# Patient Record
Sex: FEMALE | Race: WHITE | NOT HISPANIC OR LATINO | URBAN - METROPOLITAN AREA
[De-identification: names, ages, dates, MRNs, and addresses within clinical notes are randomized per-mention and may not be internally consistent; named-entity substitution may affect disease eponyms.]

---

## 2019-10-01 ENCOUNTER — APPOINTMENT (RX ONLY)
Dept: URBAN - METROPOLITAN AREA CLINIC 70 | Facility: CLINIC | Age: 34
Setting detail: DERMATOLOGY
End: 2019-10-01

## 2019-10-01 ENCOUNTER — APPOINTMENT (RX ONLY)
Dept: URBAN - METROPOLITAN AREA CLINIC 71 | Facility: CLINIC | Age: 34
Setting detail: DERMATOLOGY
End: 2019-10-01

## 2019-10-01 DIAGNOSIS — L40.0 PSORIASIS VULGARIS: ICD-10-CM

## 2019-10-01 DIAGNOSIS — Z71.89 OTHER SPECIFIED COUNSELING: ICD-10-CM

## 2019-10-01 PROBLEM — F41.9 ANXIETY DISORDER, UNSPECIFIED: Status: ACTIVE | Noted: 2019-10-01

## 2019-10-01 PROCEDURE — 99202 OFFICE O/P NEW SF 15 MIN: CPT

## 2019-10-01 PROCEDURE — ? TREATMENT REGIMEN

## 2019-10-01 PROCEDURE — ? PRESCRIPTION

## 2019-10-01 PROCEDURE — ? COUNSELING

## 2019-10-01 RX ORDER — PIMECROLIMUS 10 MG/G
FTU CREAM TOPICAL BID
Qty: 1 | Refills: 2 | Status: ERX | COMMUNITY
Start: 2019-10-01

## 2019-10-01 RX ORDER — HYDROCORTISONE 25 MG/G
FTU OINTMENT TOPICAL BID
Qty: 1 | Refills: 1 | Status: ERX | COMMUNITY
Start: 2019-10-01

## 2019-10-01 RX ORDER — CALCIPOTRIENE AND BETAMETHASONE DIPROPIONATE 50; .5 UG/G; MG/G
FTU AEROSOL, FOAM TOPICAL QHS
Qty: 1 | Refills: 1 | Status: ERX | COMMUNITY
Start: 2019-10-01

## 2019-10-01 RX ADMIN — HYDROCORTISONE FTU: 25 OINTMENT TOPICAL at 15:58

## 2019-10-01 RX ADMIN — CALCIPOTRIENE AND BETAMETHASONE DIPROPIONATE FTU: 50; .5 AEROSOL, FOAM TOPICAL at 15:55

## 2019-10-01 RX ADMIN — PIMECROLIMUS FTU: 10 CREAM TOPICAL at 15:59

## 2019-10-01 ASSESSMENT — LOCATION SIMPLE DESCRIPTION DERM
LOCATION SIMPLE: RIGHT SUPERIOR EYELID
LOCATION SIMPLE: LEFT SUPERIOR EYELID
LOCATION SIMPLE: TRAPEZIAL NECK
LOCATION SIMPLE: LEFT SUPERIOR EYELID
LOCATION SIMPLE: LEFT ELBOW
LOCATION SIMPLE: TRAPEZIAL NECK
LOCATION SIMPLE: RIGHT SUPERIOR EYELID
LOCATION SIMPLE: LEFT ELBOW

## 2019-10-01 ASSESSMENT — LOCATION ZONE DERM
LOCATION ZONE: ARM
LOCATION ZONE: NECK
LOCATION ZONE: NECK
LOCATION ZONE: EYELID
LOCATION ZONE: EYELID
LOCATION ZONE: ARM

## 2019-10-01 ASSESSMENT — LOCATION DETAILED DESCRIPTION DERM
LOCATION DETAILED: LEFT ELBOW
LOCATION DETAILED: RIGHT LATERAL SUPERIOR EYELID
LOCATION DETAILED: MID TRAPEZIAL NECK
LOCATION DETAILED: LEFT MEDIAL SUPERIOR EYELID
LOCATION DETAILED: LEFT MEDIAL SUPERIOR EYELID
LOCATION DETAILED: MID TRAPEZIAL NECK
LOCATION DETAILED: LEFT ELBOW
LOCATION DETAILED: RIGHT LATERAL SUPERIOR EYELID

## 2019-10-01 NOTE — HPI: RASH (PSORIASIS)
How Severe Is Your Psoriasis?: mild
Do You Have A Family History Of Psoriasis?: yes
Is This A New Presentation, Or A Follow-Up?: Psoriasis
Additional History: Both parents with psoriasis, initial diagnosis 10 years ago, waxing/waning

## 2019-10-01 NOTE — PROCEDURE: TREATMENT REGIMEN
Action 4: Continue
Initiate Regimen: Enstilar QD for elbows \\nHydrocortisone BID PRN\\nElidel BID
Plan: Initiate regimen above, call with questions in concerns. Overuse of steroids reviewed. Brief review of expectations, treatment options, arthritic component. Seeing PCP tomorrow to discuss sore hands, she does work with her hands daily at work. Reviewed option for rheumatology evaluation with [facility].
Discontinue Regimen: Betamethasone
Detail Level: Zone

## 2019-10-01 NOTE — HPI: RASH (PSORIASIS)
Do You Have A Family History Of Psoriasis?: yes
How Severe Is Your Psoriasis?: mild
Is This A New Presentation, Or A Follow-Up?: Psoriasis
Additional History: Both parents with psoriasis, initial diagnosis 10 years ago, waxing/waning

## 2020-07-26 ENCOUNTER — HOSPITAL ENCOUNTER (EMERGENCY)
Dept: HOSPITAL 75 - ER | Age: 35
Discharge: HOME | End: 2020-07-26
Payer: COMMERCIAL

## 2020-07-26 VITALS — HEIGHT: 66.14 IN | WEIGHT: 125.66 LBS | BODY MASS INDEX: 20.2 KG/M2

## 2020-07-26 VITALS — SYSTOLIC BLOOD PRESSURE: 116 MMHG | DIASTOLIC BLOOD PRESSURE: 72 MMHG

## 2020-07-26 DIAGNOSIS — Z88.2: ICD-10-CM

## 2020-07-26 DIAGNOSIS — R10.84: Primary | ICD-10-CM

## 2020-07-26 LAB
ALBUMIN SERPL-MCNC: 4.3 GM/DL (ref 3.2–4.5)
ALP SERPL-CCNC: 27 U/L (ref 40–136)
ALT SERPL-CCNC: 13 U/L (ref 0–55)
AMORPH SED URNS QL MICRO: (no result) /LPF
APTT PPP: YELLOW S
BACTERIA #/AREA URNS HPF: (no result) /HPF
BASOPHILS # BLD AUTO: 0 10^3/UL (ref 0–0.1)
BASOPHILS NFR BLD AUTO: 1 % (ref 0–10)
BILIRUB SERPL-MCNC: 0.2 MG/DL (ref 0.1–1)
BILIRUB UR QL STRIP: NEGATIVE
BUN/CREAT SERPL: 11
CALCIUM SERPL-MCNC: 9 MG/DL (ref 8.5–10.1)
CHLORIDE SERPL-SCNC: 107 MMOL/L (ref 98–107)
CO2 SERPL-SCNC: 20 MMOL/L (ref 21–32)
CREAT SERPL-MCNC: 1 MG/DL (ref 0.6–1.3)
EOSINOPHIL # BLD AUTO: 0.7 10^3/UL (ref 0–0.3)
EOSINOPHIL NFR BLD AUTO: 12 % (ref 0–10)
ERYTHROCYTE [DISTWIDTH] IN BLOOD BY AUTOMATED COUNT: 14.1 % (ref 10–14.5)
FIBRINOGEN PPP-MCNC: (no result) MG/DL
GFR SERPLBLD BASED ON 1.73 SQ M-ARVRAT: > 60 ML/MIN
GLUCOSE SERPL-MCNC: 94 MG/DL (ref 70–105)
GLUCOSE UR STRIP-MCNC: NEGATIVE MG/DL
HCT VFR BLD CALC: 37 % (ref 35–52)
HGB BLD-MCNC: 12.5 G/DL (ref 11.5–16)
KETONES UR QL STRIP: NEGATIVE
LEUKOCYTE ESTERASE UR QL STRIP: NEGATIVE
LYMPHOCYTES # BLD AUTO: 1.4 X 10^3 (ref 1–4)
LYMPHOCYTES NFR BLD AUTO: 25 % (ref 12–44)
MANUAL DIFFERENTIAL PERFORMED BLD QL: NO
MCH RBC QN AUTO: 29 PG (ref 25–34)
MCHC RBC AUTO-ENTMCNC: 34 G/DL (ref 32–36)
MCV RBC AUTO: 85 FL (ref 80–99)
MONOCYTES # BLD AUTO: 0.6 X 10^3 (ref 0–1)
MONOCYTES NFR BLD AUTO: 10 % (ref 0–12)
NEUTROPHILS # BLD AUTO: 3 X 10^3 (ref 1.8–7.8)
NEUTROPHILS NFR BLD AUTO: 52 % (ref 42–75)
NITRITE UR QL STRIP: NEGATIVE
PH UR STRIP: 7 [PH] (ref 5–9)
PLATELET # BLD: 192 10^3/UL (ref 130–400)
PMV BLD AUTO: 11 FL (ref 7.4–10.4)
POTASSIUM SERPL-SCNC: 3.7 MMOL/L (ref 3.6–5)
PROT SERPL-MCNC: 7.1 GM/DL (ref 6.4–8.2)
PROT UR QL STRIP: NEGATIVE
RBC #/AREA URNS HPF: (no result) /HPF
SODIUM SERPL-SCNC: 139 MMOL/L (ref 135–145)
SP GR UR STRIP: 1.02 (ref 1.02–1.02)
SQUAMOUS #/AREA URNS HPF: (no result) /HPF
WBC # BLD AUTO: 5.7 10^3/UL (ref 4.3–11)
WBC #/AREA URNS HPF: (no result) /HPF

## 2020-07-26 PROCEDURE — 86141 C-REACTIVE PROTEIN HS: CPT

## 2020-07-26 PROCEDURE — 99282 EMERGENCY DEPT VISIT SF MDM: CPT

## 2020-07-26 PROCEDURE — 80053 COMPREHEN METABOLIC PANEL: CPT

## 2020-07-26 PROCEDURE — 85025 COMPLETE CBC W/AUTO DIFF WBC: CPT

## 2020-07-26 PROCEDURE — 36415 COLL VENOUS BLD VENIPUNCTURE: CPT

## 2020-07-26 PROCEDURE — 84703 CHORIONIC GONADOTROPIN ASSAY: CPT

## 2020-07-26 PROCEDURE — 81000 URINALYSIS NONAUTO W/SCOPE: CPT

## 2020-07-26 NOTE — ED ABDOMINAL PAIN
General


Chief Complaint:  Abdominal/GI Problems


Stated Complaint:  R SIDE ABD PAIN


Nursing Triage Note:  


Patient reports RLQ abdomen pain that woke her up this morning, is not having 


any n/v/d. pain is worse with movement, coughing, sneezing or raising her voice


Sepsis Screen:  No Definite Risk


Source of Information:  Patient


Exam Limitations:  No Limitations





History of Present Illness


Date Seen by Provider:  Jul 26, 2020


Time Seen by Provider:  18:03


Initial Comments


This 34-year-old woman presents to the emergency room with complaints of right 

lower quadrant pain that woke her from sleep this morning.  Pain seems to be 

much worse with movement, coughing, laughing, talking, etc.  It has somewhat 

waxed and waned throughout the day.  She denies any fever, nausea, vomiting, 

diarrhea, or urinary changes.  Her LMP was 2 weeks ago.  She started oral birth 

control after that and had one day of bleeding yesterday.  She reports a history

of IBS-like symptoms where she has abdominal pain and frequent stooling promptly

after eating.  That is a chronic issue for which she is being worked up by her 

primary care provider in [place].  She is here visiting her parents in 

[place], [place].  Pain today does not seem to be associated with bleeding.  On 

exam, she has some mild left-sided tenderness but no tenderness in the right 

lower quadrant.  She denies pregnancy as she has not been sexually active in 

about a year.  She describes the pain as a pinching or stabbing sensation.





Allergies and Home Medications


Allergies


Coded Allergies:  


     Sulfa (Sulfonamide Antibiotics) (Verified  Allergy, Unknown, Hives, 

7/26/20)


     sulfamethoxazole (Verified  Allergy, Unknown, Hives, 7/26/20)


     trimethoprim (Verified  Allergy, Unknown, Hives, 7/26/20)





Patient Home Medication List


Home Medication List Reviewed:  Yes





Review of Systems


Review of Systems


Constitutional:  no symptoms reported


EENTM:  No Symptoms Reported


Respiratory:  No Symptoms Reported


Cardiovascular:  No Symptoms Reported


Gastrointestinal:  See HPI


Genitourinary:  No Symptoms Reported


Musculoskeletal:  no symptoms reported


Skin:  no symptoms reported


Psychiatric/Neurological:  No Symptoms Reported


Endocrine:  No Symptoms Reported


Hematologic/Lymphatic:  No Symptoms Reported





Past Medical-Social-Family Hx


Past Med/Social Hx:  Reviewed Nursing Past Med/Soc Hx


Patient Social History


Alcohol Use:  Occasionally Uses


Alcohol Beverage of Choice:  Wine, Vodka


Recreational Drug Use:  No


2nd Hand Smoke Exposure:  No


Recent Foreign Travel:  No


Contact w/Someone Who Travel:  No


Recent Infectious Disease Expo:  No





Past Medical History


Surgeries:  No


Respiratory:  No


Cardiac:  No


Neurological:  No


Genitourinary:  No


Gastrointestinal:  Yes


Musculoskeletal:  No


Endocrine:  No


HEENT:  No


Cancer:  No


Psychosocial:  No


Integumentary:  No


Blood Disorders:  No





Physical Exam


Vital Signs





Vital Signs - First Documented








 7/26/20





 17:58


 


Temp 37.1


 


Pulse 86


 


Resp 18


 


B/P (MAP) 116/72 (87)


 


Pulse Ox 97





Capillary Refill : Less Than 3 Seconds


Height/Weight/BMI


Height: '"


Weight: lbs. oz. kg; 20.00 BMI


Method:


General Appearance:  WD/WN, no apparent distress


HEENT:  PERRL/EOMI, normal ENT inspection


Neck:  normal inspection


Respiratory:  lungs clear, normal breath sounds, no respiratory distress


Cardiovascular:  regular rate, rhythm, no edema, no murmur


Gastrointestinal:  normal bowel sounds, soft; No distended, No guarding; 

tenderness (minimal on the left.  No right lower quadrant tenderness.), other 

(negative Rovsing, rebound, ileus psoas.  Slightly positive obturator)


Extremities:  normal inspection, no pedal edema


Back:  normal inspection, no CVA tenderness


Neurologic/Psychiatric:  CNs II-XII nml as tested, no motor/sensory deficits, 

alert, normal mood/affect, oriented x 3


Skin:  normal color, warm/dry





Progress/Results/Core Measures


Results/Orders


Lab Results





Laboratory Tests








Test


 7/26/20


18:10 7/26/20


18:15 Range/Units


 


 


Urine Color YELLOW    


 


Urine Clarity SL CLOUDY    


 


Urine pH 7.0   5-9  


 


Urine Specific Gravity 1.020   1.016-1.022  


 


Urine Protein NEGATIVE   NEGATIVE  


 


Urine Glucose (UA) NEGATIVE   NEGATIVE  


 


Urine Ketones NEGATIVE   NEGATIVE  


 


Urine Nitrite NEGATIVE   NEGATIVE  


 


Urine Bilirubin NEGATIVE   NEGATIVE  


 


Urine Urobilinogen 0.2   < = 1.0  MG/DL


 


Urine Leukocyte Esterase NEGATIVE   NEGATIVE  


 


Urine RBC (Auto) 2+ H  NEGATIVE  


 


Urine RBC RARE    /HPF


 


Urine WBC RARE    /HPF


 


Urine Squamous Epithelial


Cells 5-10 


 


  /HPF





 


Urine Crystals PRESENT H   /LPF


 


Urine Amorphous Sediment


 MOD BRENDAN


PHOSPHATE H 


  /LPF





 


Urine Bacteria TRACE    /HPF


 


Urine Casts NONE    /LPF


 


Urine Mucus NEGATIVE    /LPF


 


Urine Culture Indicated NO    


 


White Blood Count


 


 5.7 


 4.3-11.0


10^3/uL


 


Red Blood Count


 


 4.30 L


 4.35-5.85


10^6/uL


 


Hemoglobin  12.5  11.5-16.0  G/DL


 


Hematocrit  37  35-52  %


 


Mean Corpuscular Volume  85  80-99  FL


 


Mean Corpuscular Hemoglobin  29  25-34  PG


 


Mean Corpuscular Hemoglobin


Concent 


 34 


 32-36  G/DL





 


Red Cell Distribution Width  14.1  10.0-14.5  %


 


Platelet Count


 


 192 


 130-400


10^3/uL


 


Mean Platelet Volume  11.0 H 7.4-10.4  FL


 


Neutrophils (%) (Auto)  52  42-75  %


 


Lymphocytes (%) (Auto)  25  12-44  %


 


Monocytes (%) (Auto)  10  0-12  %


 


Eosinophils (%) (Auto)  12 H 0-10  %


 


Basophils (%) (Auto)  1  0-10  %


 


Neutrophils # (Auto)  3.0  1.8-7.8  X 10^3


 


Lymphocytes # (Auto)  1.4  1.0-4.0  X 10^3


 


Monocytes # (Auto)  0.6  0.0-1.0  X 10^3


 


Eosinophils # (Auto)


 


 0.7 H


 0.0-0.3


10^3/uL


 


Basophils # (Auto)


 


 0.0 


 0.0-0.1


10^3/uL


 


Sodium Level  139  135-145  MMOL/L


 


Potassium Level  3.7  3.6-5.0  MMOL/L


 


Chloride Level  107    MMOL/L


 


Carbon Dioxide Level  20 L 21-32  MMOL/L


 


Anion Gap  12  5-14  MMOL/L


 


Blood Urea Nitrogen  11  7-18  MG/DL


 


Creatinine


 


 1.00 


 0.60-1.30


MG/DL


 


Estimat Glomerular Filtration


Rate 


 > 60 


  





 


BUN/Creatinine Ratio  11   


 


Glucose Level  94    MG/DL


 


Calcium Level  9.0  8.5-10.1  MG/DL


 


Corrected Calcium  8.8  8.5-10.1  MG/DL


 


Total Bilirubin  0.2  0.1-1.0  MG/DL


 


Aspartate Amino Transf


(AST/SGOT) 


 15 


 5-34  U/L





 


Alanine Aminotransferase


(ALT/SGPT) 


 13 


 0-55  U/L





 


Alkaline Phosphatase  27 L   U/L


 


C-Reactive Protein High


Sensitivity 


 0.07 


 0.00-0.50


MG/DL


 


Total Protein  7.1  6.4-8.2  GM/DL


 


Albumin  4.3  3.2-4.5  GM/DL


 


Serum Pregnancy Test,


Qualitative 


 NEGATIVE 


 NEGATIVE  











My Orders





Orders - CELESTINE XIE MD


Cbc With Automated Diff (7/26/20 18:22)


Comprehensive Metabolic Panel (7/26/20 18:22)


Hs C Reactive Protein (7/26/20 18:22)


Hcg,Qualitative Serum (7/26/20 18:22)





Vital Signs/I&O











 7/26/20 7/26/20





 17:58 19:33


 


Temp 37.1 37.1


 


Pulse 86 86


 


Resp 18 18


 


B/P (MAP) 116/72 (87) 116/72 (87)


 


Pulse Ox 97 97














Blood Pressure Mean:                    87











Progress


Progress Note :  


Progress Note


Patient's examination was unimpressive for focal abdominal tenderness or 

peritoneal signs.  Labs were obtained and did not suggest bacterial infection.  

Careful observation at home was recommended to the patient.





Departure


Impression





   Primary Impression:  


   Generalized abdominal pain


Disposition:  01 HOME, SELF-CARE


Condition:  Stable





Departure-Patient Inst.


Decision time for Depature:  19:30


Referrals:  


VIPIN BUSTILLOS DO (PCP/Family)


Primary Care Physician


Patient Instructions:  Severe Abdominal Pain





Add. Discharge Instructions:  


Your workup in the emergency room was unremarkable and did not suggest bacterial

infection such as appendicitis.


I recommend a clear liquid diet for the remainder of today.  Then gradually 

advance your diet with small quantities of bland food as tolerated.  


You may take ibuprofen up to 600 mg every 6 hours as needed and/or Tylenol 

(acetaminophen) up to 650 mg every 6 hours as needed.


Return to care if you have worsening symptoms or you develop additional symptoms

such as vomiting or fevers.





All discharge instructions reviewed with patient and/or family. Voiced 

understanding.











CELESTINE XIE MD        Jul 26, 2020 18:48